# Patient Record
Sex: MALE | Race: WHITE
[De-identification: names, ages, dates, MRNs, and addresses within clinical notes are randomized per-mention and may not be internally consistent; named-entity substitution may affect disease eponyms.]

---

## 2018-06-07 ENCOUNTER — HOSPITAL ENCOUNTER (OUTPATIENT)
Dept: HOSPITAL 8 - ST | Age: 58
Discharge: HOME | End: 2018-06-07
Attending: FAMILY MEDICINE
Payer: COMMERCIAL

## 2018-06-07 DIAGNOSIS — M17.0: Primary | ICD-10-CM

## 2018-06-07 PROCEDURE — 93005 ELECTROCARDIOGRAM TRACING: CPT

## 2018-06-18 ENCOUNTER — HOSPITAL ENCOUNTER (OUTPATIENT)
Dept: HOSPITAL 8 - STAR | Age: 58
Discharge: HOME | End: 2018-06-18
Attending: ORTHOPAEDIC SURGERY
Payer: COMMERCIAL

## 2018-06-18 DIAGNOSIS — Z01.818: Primary | ICD-10-CM

## 2018-06-18 DIAGNOSIS — M17.0: ICD-10-CM

## 2018-06-18 LAB
BASOPHILS # BLD AUTO: 0.03 X10^3/UL (ref 0–0.1)
BASOPHILS NFR BLD AUTO: 0 % (ref 0–1)
CULTURE INDICATED?: YES
EOSINOPHIL # BLD AUTO: 0.32 X10^3/UL (ref 0–0.4)
EOSINOPHIL NFR BLD AUTO: 3 % (ref 1–7)
ERYTHROCYTE [DISTWIDTH] IN BLOOD BY AUTOMATED COUNT: 13.7 % (ref 9.4–14.8)
LYMPHOCYTES # BLD AUTO: 1.51 X10^3/UL (ref 1–3.4)
LYMPHOCYTES NFR BLD AUTO: 15 % (ref 22–44)
MCH RBC QN AUTO: 30.1 PG (ref 27.5–34.5)
MCHC RBC AUTO-ENTMCNC: 33.8 G/DL (ref 33.2–36.2)
MCV RBC AUTO: 89 FL (ref 81–97)
MD: NO
MICROSCOPIC: (no result)
MONOCYTES # BLD AUTO: 0.69 X10^3/UL (ref 0.2–0.8)
MONOCYTES NFR BLD AUTO: 7 % (ref 2–9)
NEUTROPHILS # BLD AUTO: 7.65 X10^3/UL (ref 1.8–6.8)
NEUTROPHILS NFR BLD AUTO: 75 % (ref 42–75)
PLATELET # BLD AUTO: 219 X10^3/UL (ref 130–400)
PMV BLD AUTO: 10.2 FL (ref 7.4–10.4)
RBC # BLD AUTO: 5.46 X10^6/UL (ref 4.38–5.82)

## 2018-06-18 PROCEDURE — 87086 URINE CULTURE/COLONY COUNT: CPT

## 2018-06-18 PROCEDURE — 85025 COMPLETE CBC W/AUTO DIFF WBC: CPT

## 2018-06-18 PROCEDURE — 87081 CULTURE SCREEN ONLY: CPT

## 2018-06-18 PROCEDURE — 81001 URINALYSIS AUTO W/SCOPE: CPT

## 2018-06-18 PROCEDURE — 36415 COLL VENOUS BLD VENIPUNCTURE: CPT

## 2018-06-25 ENCOUNTER — HOSPITAL ENCOUNTER (INPATIENT)
Dept: HOSPITAL 8 - ORIP | Age: 58
LOS: 4 days | Discharge: NURSING, REHAB OR LONG-TERM CARE | DRG: 462 | End: 2018-06-29
Attending: ORTHOPAEDIC SURGERY | Admitting: ORTHOPAEDIC SURGERY
Payer: COMMERCIAL

## 2018-06-25 VITALS — SYSTOLIC BLOOD PRESSURE: 140 MMHG | DIASTOLIC BLOOD PRESSURE: 92 MMHG

## 2018-06-25 VITALS — BODY MASS INDEX: 38.22 KG/M2 | WEIGHT: 288.36 LBS | HEIGHT: 73 IN

## 2018-06-25 DIAGNOSIS — E66.9: ICD-10-CM

## 2018-06-25 DIAGNOSIS — G47.33: ICD-10-CM

## 2018-06-25 DIAGNOSIS — F32.9: ICD-10-CM

## 2018-06-25 DIAGNOSIS — M17.0: Primary | ICD-10-CM

## 2018-06-25 PROCEDURE — 0SRC069 REPLACEMENT OF RIGHT KNEE JOINT WITH OXIDIZED ZIRCONIUM ON POLYETHYLENE SYNTHETIC SUBSTITUTE, CEMENTED, OPEN APPROACH: ICD-10-PCS | Performed by: ORTHOPAEDIC SURGERY

## 2018-06-25 PROCEDURE — 0SRD069 REPLACEMENT OF LEFT KNEE JOINT WITH OXIDIZED ZIRCONIUM ON POLYETHYLENE SYNTHETIC SUBSTITUTE, CEMENTED, OPEN APPROACH: ICD-10-PCS | Performed by: ORTHOPAEDIC SURGERY

## 2018-06-25 PROCEDURE — 3E0T3BZ INTRODUCTION OF ANESTHETIC AGENT INTO PERIPHERAL NERVES AND PLEXI, PERCUTANEOUS APPROACH: ICD-10-PCS | Performed by: ORTHOPAEDIC SURGERY

## 2018-06-25 PROCEDURE — 36415 COLL VENOUS BLD VENIPUNCTURE: CPT

## 2018-06-25 PROCEDURE — C1776 JOINT DEVICE (IMPLANTABLE): HCPCS

## 2018-06-25 PROCEDURE — C1713 ANCHOR/SCREW BN/BN,TIS/BN: HCPCS

## 2018-06-25 PROCEDURE — 85014 HEMATOCRIT: CPT

## 2018-06-25 PROCEDURE — 85018 HEMOGLOBIN: CPT

## 2018-06-25 RX ADMIN — DOCUSATE SODIUM SCH MG: 100 CAPSULE, LIQUID FILLED ORAL at 20:11

## 2018-06-25 RX ADMIN — OXYCODONE HYDROCHLORIDE SCH MG: 5 TABLET ORAL at 18:15

## 2018-06-25 RX ADMIN — ASPIRIN SCH MG: 81 TABLET, COATED ORAL at 22:49

## 2018-06-25 RX ADMIN — ONDANSETRON PRN MG: 2 INJECTION, SOLUTION INTRAMUSCULAR; INTRAVENOUS at 22:49

## 2018-06-25 RX ADMIN — HYDROMORPHONE HYDROCHLORIDE PRN MG: 1 INJECTION, SOLUTION INTRAMUSCULAR; INTRAVENOUS; SUBCUTANEOUS at 16:20

## 2018-06-25 RX ADMIN — ONDANSETRON PRN MG: 2 INJECTION, SOLUTION INTRAMUSCULAR; INTRAVENOUS at 16:19

## 2018-06-25 RX ADMIN — HYDROMORPHONE HYDROCHLORIDE PRN MG: 1 INJECTION, SOLUTION INTRAMUSCULAR; INTRAVENOUS; SUBCUTANEOUS at 16:15

## 2018-06-25 RX ADMIN — DEXTROSE AND SODIUM CHLORIDE SCH MLS/HR: 5; .45 INJECTION, SOLUTION INTRAVENOUS at 18:43

## 2018-06-25 RX ADMIN — ACETAMINOPHEN SCH MG: 160 SOLUTION ORAL at 20:11

## 2018-06-25 RX ADMIN — ACETAMINOPHEN SCH MG: 160 SOLUTION ORAL at 18:15

## 2018-06-25 RX ADMIN — CEFAZOLIN SODIUM SCH MLS/HR: 2 SOLUTION INTRAVENOUS at 20:19

## 2018-06-25 RX ADMIN — HYDROMORPHONE HYDROCHLORIDE PRN MG: 1 INJECTION, SOLUTION INTRAMUSCULAR; INTRAVENOUS; SUBCUTANEOUS at 16:30

## 2018-06-25 RX ADMIN — OXYCODONE HYDROCHLORIDE SCH MG: 5 TABLET ORAL at 20:11

## 2018-06-25 RX ADMIN — HYDROMORPHONE HYDROCHLORIDE PRN MG: 1 INJECTION, SOLUTION INTRAMUSCULAR; INTRAVENOUS; SUBCUTANEOUS at 16:35

## 2018-06-26 VITALS — DIASTOLIC BLOOD PRESSURE: 61 MMHG | SYSTOLIC BLOOD PRESSURE: 130 MMHG

## 2018-06-26 VITALS — SYSTOLIC BLOOD PRESSURE: 121 MMHG | DIASTOLIC BLOOD PRESSURE: 75 MMHG

## 2018-06-26 VITALS — DIASTOLIC BLOOD PRESSURE: 70 MMHG | SYSTOLIC BLOOD PRESSURE: 130 MMHG

## 2018-06-26 VITALS — DIASTOLIC BLOOD PRESSURE: 67 MMHG | SYSTOLIC BLOOD PRESSURE: 111 MMHG

## 2018-06-26 VITALS — DIASTOLIC BLOOD PRESSURE: 70 MMHG | SYSTOLIC BLOOD PRESSURE: 129 MMHG

## 2018-06-26 RX ADMIN — SERTRALINE HYDROCHLORIDE SCH MG: 50 TABLET ORAL at 08:50

## 2018-06-26 RX ADMIN — ACETAMINOPHEN SCH MG: 160 SOLUTION ORAL at 03:30

## 2018-06-26 RX ADMIN — OXYCODONE HYDROCHLORIDE SCH MG: 5 TABLET ORAL at 21:46

## 2018-06-26 RX ADMIN — CEFAZOLIN SODIUM SCH MLS/HR: 2 SOLUTION INTRAVENOUS at 05:12

## 2018-06-26 RX ADMIN — Medication SCH TAB: at 08:50

## 2018-06-26 RX ADMIN — ACETAMINOPHEN SCH MG: 160 SOLUTION ORAL at 21:46

## 2018-06-26 RX ADMIN — DEXTROSE AND SODIUM CHLORIDE SCH MLS/HR: 5; .45 INJECTION, SOLUTION INTRAVENOUS at 14:30

## 2018-06-26 RX ADMIN — DOCUSATE SODIUM SCH MG: 100 CAPSULE, LIQUID FILLED ORAL at 08:51

## 2018-06-26 RX ADMIN — OXYCODONE HYDROCHLORIDE SCH MG: 5 TABLET ORAL at 13:20

## 2018-06-26 RX ADMIN — OXYCODONE HYDROCHLORIDE SCH MG: 5 TABLET ORAL at 09:33

## 2018-06-26 RX ADMIN — OXYCODONE HYDROCHLORIDE SCH MG: 5 TABLET ORAL at 17:46

## 2018-06-26 RX ADMIN — DEXTROSE AND SODIUM CHLORIDE SCH MLS/HR: 5; .45 INJECTION, SOLUTION INTRAVENOUS at 21:10

## 2018-06-26 RX ADMIN — ACETAMINOPHEN SCH MG: 160 SOLUTION ORAL at 08:51

## 2018-06-26 RX ADMIN — ASPIRIN SCH MG: 81 TABLET, COATED ORAL at 17:46

## 2018-06-26 RX ADMIN — OXYCODONE HYDROCHLORIDE SCH MG: 5 TABLET ORAL at 01:08

## 2018-06-26 RX ADMIN — ASPIRIN SCH MG: 81 TABLET, COATED ORAL at 05:43

## 2018-06-26 RX ADMIN — DEXTROSE AND SODIUM CHLORIDE SCH MLS/HR: 5; .45 INJECTION, SOLUTION INTRAVENOUS at 02:07

## 2018-06-26 RX ADMIN — DOCUSATE SODIUM SCH MG: 100 CAPSULE, LIQUID FILLED ORAL at 21:45

## 2018-06-26 RX ADMIN — DEXTROSE AND SODIUM CHLORIDE SCH MLS/HR: 5; .45 INJECTION, SOLUTION INTRAVENOUS at 07:50

## 2018-06-26 RX ADMIN — ACETAMINOPHEN SCH MG: 160 SOLUTION ORAL at 14:59

## 2018-06-26 RX ADMIN — OXYCODONE HYDROCHLORIDE SCH MG: 5 TABLET ORAL at 05:43

## 2018-06-27 VITALS — SYSTOLIC BLOOD PRESSURE: 120 MMHG | DIASTOLIC BLOOD PRESSURE: 75 MMHG

## 2018-06-27 VITALS — SYSTOLIC BLOOD PRESSURE: 121 MMHG | DIASTOLIC BLOOD PRESSURE: 68 MMHG

## 2018-06-27 VITALS — DIASTOLIC BLOOD PRESSURE: 68 MMHG | SYSTOLIC BLOOD PRESSURE: 116 MMHG

## 2018-06-27 VITALS — SYSTOLIC BLOOD PRESSURE: 126 MMHG | DIASTOLIC BLOOD PRESSURE: 75 MMHG

## 2018-06-27 RX ADMIN — DIAZEPAM PRN MG: 5 TABLET ORAL at 11:27

## 2018-06-27 RX ADMIN — OXYCODONE HYDROCHLORIDE SCH MG: 5 TABLET ORAL at 09:29

## 2018-06-27 RX ADMIN — ASPIRIN SCH MG: 81 TABLET, COATED ORAL at 05:38

## 2018-06-27 RX ADMIN — OXYCODONE HYDROCHLORIDE SCH MG: 5 TABLET ORAL at 01:39

## 2018-06-27 RX ADMIN — DEXTROSE AND SODIUM CHLORIDE SCH MLS/HR: 5; .45 INJECTION, SOLUTION INTRAVENOUS at 22:28

## 2018-06-27 RX ADMIN — DIAZEPAM PRN MG: 5 TABLET ORAL at 23:57

## 2018-06-27 RX ADMIN — SERTRALINE HYDROCHLORIDE SCH MG: 50 TABLET ORAL at 09:36

## 2018-06-27 RX ADMIN — ACETAMINOPHEN SCH MG: 160 SOLUTION ORAL at 15:28

## 2018-06-27 RX ADMIN — ACETAMINOPHEN SCH MG: 160 SOLUTION ORAL at 21:26

## 2018-06-27 RX ADMIN — DIAZEPAM PRN MG: 5 TABLET ORAL at 07:36

## 2018-06-27 RX ADMIN — DEXTROSE AND SODIUM CHLORIDE SCH MLS/HR: 5; .45 INJECTION, SOLUTION INTRAVENOUS at 17:10

## 2018-06-27 RX ADMIN — DOCUSATE SODIUM SCH MG: 100 CAPSULE, LIQUID FILLED ORAL at 20:05

## 2018-06-27 RX ADMIN — ASPIRIN SCH MG: 81 TABLET, COATED ORAL at 17:47

## 2018-06-27 RX ADMIN — Medication SCH TAB: at 09:29

## 2018-06-27 RX ADMIN — OXYCODONE HYDROCHLORIDE SCH MG: 5 TABLET ORAL at 13:25

## 2018-06-27 RX ADMIN — ACETAMINOPHEN SCH MG: 160 SOLUTION ORAL at 03:46

## 2018-06-27 RX ADMIN — DIAZEPAM PRN MG: 5 TABLET ORAL at 20:05

## 2018-06-27 RX ADMIN — OXYCODONE HYDROCHLORIDE SCH MG: 5 TABLET ORAL at 21:26

## 2018-06-27 RX ADMIN — DEXTROSE AND SODIUM CHLORIDE SCH MLS/HR: 5; .45 INJECTION, SOLUTION INTRAVENOUS at 03:50

## 2018-06-27 RX ADMIN — DOCUSATE SODIUM SCH MG: 100 CAPSULE, LIQUID FILLED ORAL at 09:29

## 2018-06-27 RX ADMIN — DIAZEPAM PRN MG: 5 TABLET ORAL at 02:47

## 2018-06-27 RX ADMIN — OXYCODONE HYDROCHLORIDE SCH MG: 5 TABLET ORAL at 17:30

## 2018-06-27 RX ADMIN — DEXTROSE AND SODIUM CHLORIDE SCH MLS/HR: 5; .45 INJECTION, SOLUTION INTRAVENOUS at 10:30

## 2018-06-27 RX ADMIN — DIAZEPAM PRN MG: 5 TABLET ORAL at 15:28

## 2018-06-27 RX ADMIN — OXYCODONE HYDROCHLORIDE SCH MG: 5 TABLET ORAL at 05:37

## 2018-06-27 RX ADMIN — ACETAMINOPHEN SCH MG: 160 SOLUTION ORAL at 09:28

## 2018-06-28 VITALS — DIASTOLIC BLOOD PRESSURE: 75 MMHG | SYSTOLIC BLOOD PRESSURE: 122 MMHG

## 2018-06-28 VITALS — DIASTOLIC BLOOD PRESSURE: 67 MMHG | SYSTOLIC BLOOD PRESSURE: 105 MMHG

## 2018-06-28 VITALS — SYSTOLIC BLOOD PRESSURE: 126 MMHG | DIASTOLIC BLOOD PRESSURE: 75 MMHG

## 2018-06-28 VITALS — DIASTOLIC BLOOD PRESSURE: 68 MMHG | SYSTOLIC BLOOD PRESSURE: 123 MMHG

## 2018-06-28 RX ADMIN — DIAZEPAM PRN MG: 5 TABLET ORAL at 04:38

## 2018-06-28 RX ADMIN — ASPIRIN SCH MG: 81 TABLET, COATED ORAL at 18:06

## 2018-06-28 RX ADMIN — ACETAMINOPHEN SCH MG: 160 SOLUTION ORAL at 03:45

## 2018-06-28 RX ADMIN — DEXTROSE AND SODIUM CHLORIDE SCH MLS/HR: 5; .45 INJECTION, SOLUTION INTRAVENOUS at 05:41

## 2018-06-28 RX ADMIN — OXYCODONE HYDROCHLORIDE SCH MG: 5 TABLET ORAL at 10:03

## 2018-06-28 RX ADMIN — Medication SCH TAB: at 08:20

## 2018-06-28 RX ADMIN — ACETAMINOPHEN SCH MG: 160 SOLUTION ORAL at 22:33

## 2018-06-28 RX ADMIN — ACETAMINOPHEN SCH MG: 160 SOLUTION ORAL at 10:02

## 2018-06-28 RX ADMIN — OXYCODONE HYDROCHLORIDE SCH MG: 5 TABLET ORAL at 18:06

## 2018-06-28 RX ADMIN — DIAZEPAM PRN MG: 5 TABLET ORAL at 19:55

## 2018-06-28 RX ADMIN — ACETAMINOPHEN SCH MG: 160 SOLUTION ORAL at 15:25

## 2018-06-28 RX ADMIN — OXYCODONE HYDROCHLORIDE SCH MG: 5 TABLET ORAL at 22:34

## 2018-06-28 RX ADMIN — OXYCODONE HYDROCHLORIDE SCH MG: 5 TABLET ORAL at 01:31

## 2018-06-28 RX ADMIN — DEXTROSE AND SODIUM CHLORIDE SCH MLS/HR: 5; .45 INJECTION, SOLUTION INTRAVENOUS at 13:10

## 2018-06-28 RX ADMIN — DOCUSATE SODIUM SCH MG: 100 CAPSULE, LIQUID FILLED ORAL at 22:41

## 2018-06-28 RX ADMIN — SERTRALINE HYDROCHLORIDE SCH MG: 50 TABLET ORAL at 08:20

## 2018-06-28 RX ADMIN — DOCUSATE SODIUM SCH MG: 100 CAPSULE, LIQUID FILLED ORAL at 08:20

## 2018-06-28 RX ADMIN — OXYCODONE HYDROCHLORIDE SCH MG: 5 TABLET ORAL at 05:41

## 2018-06-28 RX ADMIN — ASPIRIN SCH MG: 81 TABLET, COATED ORAL at 05:41

## 2018-06-28 RX ADMIN — OXYCODONE HYDROCHLORIDE SCH MG: 5 TABLET ORAL at 14:24

## 2018-06-28 RX ADMIN — DEXTROSE AND SODIUM CHLORIDE SCH MLS/HR: 5; .45 INJECTION, SOLUTION INTRAVENOUS at 19:50

## 2018-06-29 VITALS — DIASTOLIC BLOOD PRESSURE: 63 MMHG | SYSTOLIC BLOOD PRESSURE: 106 MMHG

## 2018-06-29 VITALS — SYSTOLIC BLOOD PRESSURE: 120 MMHG | DIASTOLIC BLOOD PRESSURE: 69 MMHG

## 2018-06-29 VITALS — DIASTOLIC BLOOD PRESSURE: 54 MMHG | SYSTOLIC BLOOD PRESSURE: 108 MMHG

## 2018-06-29 RX ADMIN — ACETAMINOPHEN SCH MG: 160 SOLUTION ORAL at 05:21

## 2018-06-29 RX ADMIN — DIAZEPAM PRN MG: 5 TABLET ORAL at 03:12

## 2018-06-29 RX ADMIN — ASPIRIN SCH MG: 81 TABLET, COATED ORAL at 07:49

## 2018-06-29 RX ADMIN — OXYCODONE HYDROCHLORIDE SCH MG: 5 TABLET ORAL at 07:50

## 2018-06-29 RX ADMIN — Medication SCH TAB: at 09:26

## 2018-06-29 RX ADMIN — OXYCODONE HYDROCHLORIDE SCH MG: 5 TABLET ORAL at 15:43

## 2018-06-29 RX ADMIN — DOCUSATE SODIUM SCH MG: 100 CAPSULE, LIQUID FILLED ORAL at 09:28

## 2018-06-29 RX ADMIN — OXYCODONE HYDROCHLORIDE SCH MG: 5 TABLET ORAL at 03:05

## 2018-06-29 RX ADMIN — OXYCODONE HYDROCHLORIDE SCH MG: 5 TABLET ORAL at 11:38

## 2018-06-29 RX ADMIN — ACETAMINOPHEN SCH MG: 160 SOLUTION ORAL at 11:39

## 2018-06-29 RX ADMIN — SERTRALINE HYDROCHLORIDE SCH MG: 50 TABLET ORAL at 09:30

## 2018-06-29 RX ADMIN — SERTRALINE HYDROCHLORIDE SCH MG: 50 TABLET ORAL at 09:27

## 2021-06-17 ENCOUNTER — HOSPITAL ENCOUNTER (EMERGENCY)
Dept: HOSPITAL 8 - ED | Age: 61
Discharge: HOME | End: 2021-06-17
Payer: COMMERCIAL

## 2021-06-17 VITALS — HEIGHT: 74 IN | BODY MASS INDEX: 34.52 KG/M2 | WEIGHT: 268.96 LBS

## 2021-06-17 VITALS — DIASTOLIC BLOOD PRESSURE: 74 MMHG | SYSTOLIC BLOOD PRESSURE: 125 MMHG

## 2021-06-17 DIAGNOSIS — N13.2: Primary | ICD-10-CM

## 2021-06-17 LAB
ALBUMIN SERPL-MCNC: 3.5 G/DL (ref 3.4–5)
ALP SERPL-CCNC: 105 U/L (ref 45–117)
ALT SERPL-CCNC: 56 U/L (ref 12–78)
ANION GAP SERPL CALC-SCNC: 8 MMOL/L (ref 5–15)
BASOPHILS # BLD AUTO: 0.1 X10^3/UL (ref 0–0.1)
BASOPHILS NFR BLD AUTO: 1 % (ref 0–1)
BILIRUB SERPL-MCNC: 0.2 MG/DL (ref 0.2–1)
CALCIUM SERPL-MCNC: 8.9 MG/DL (ref 8.5–10.1)
CHLORIDE SERPL-SCNC: 112 MMOL/L (ref 98–107)
CREAT SERPL-MCNC: 0.89 MG/DL (ref 0.7–1.3)
EOSINOPHIL # BLD AUTO: 0.8 X10^3/UL (ref 0–0.4)
EOSINOPHIL NFR BLD AUTO: 7 % (ref 1–7)
ERYTHROCYTE [DISTWIDTH] IN BLOOD BY AUTOMATED COUNT: 16.2 % (ref 9.4–14.8)
LYMPHOCYTES # BLD AUTO: 2.6 X10^3/UL (ref 1–3.4)
LYMPHOCYTES NFR BLD AUTO: 24 % (ref 22–44)
MCH RBC QN AUTO: 28.1 PG (ref 27.5–34.5)
MCHC RBC AUTO-ENTMCNC: 33.7 G/DL (ref 33.2–36.2)
MICROSCOPIC: (no result)
MONOCYTES # BLD AUTO: 1 X10^3/UL (ref 0.2–0.8)
MONOCYTES NFR BLD AUTO: 10 % (ref 2–9)
NEUTROPHILS # BLD AUTO: 6.5 X10^3/UL (ref 1.8–6.8)
NEUTROPHILS NFR BLD AUTO: 59 % (ref 42–75)
PLATELET # BLD AUTO: 243 X10^3/UL (ref 130–400)
PMV BLD AUTO: 9 FL (ref 7.4–10.4)
PROT SERPL-MCNC: 7.4 G/DL (ref 6.4–8.2)
RBC # BLD AUTO: 4.88 X10^6/UL (ref 4.38–5.82)

## 2021-06-17 PROCEDURE — 36415 COLL VENOUS BLD VENIPUNCTURE: CPT

## 2021-06-17 PROCEDURE — 74177 CT ABD & PELVIS W/CONTRAST: CPT

## 2021-06-17 PROCEDURE — 87086 URINE CULTURE/COLONY COUNT: CPT

## 2021-06-17 PROCEDURE — 96374 THER/PROPH/DIAG INJ IV PUSH: CPT

## 2021-06-17 PROCEDURE — 96375 TX/PRO/DX INJ NEW DRUG ADDON: CPT

## 2021-06-17 PROCEDURE — 99285 EMERGENCY DEPT VISIT HI MDM: CPT

## 2021-06-17 PROCEDURE — 81001 URINALYSIS AUTO W/SCOPE: CPT

## 2021-06-17 PROCEDURE — 85025 COMPLETE CBC W/AUTO DIFF WBC: CPT

## 2021-06-17 PROCEDURE — 83690 ASSAY OF LIPASE: CPT

## 2021-06-17 PROCEDURE — 80053 COMPREHEN METABOLIC PANEL: CPT

## 2021-06-17 NOTE — NUR
RELIEF RN: PT A&OX4. VSS. DENIES ANY PAIN. AWARE OF PLAN TO ADMIT, REQUESTING 
JELLO/ICE CREAM. [facility]

## 2021-06-17 NOTE — NUR
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 PAIN ON RIGHT 
FRONT ABD AND RIGHT FLANK.

UA OBTAINED QUITE BLOODY. "I HAVE A HX OF KIDNEY STONES OR IT COULD BE MY 
CANCER."

 TOOK 1GM OF APAP AT 10P

## 2021-06-26 ENCOUNTER — HOSPITAL ENCOUNTER (OUTPATIENT)
Dept: HOSPITAL 8 - OR | Age: 61
Discharge: HOME | End: 2021-06-26
Attending: UROLOGY
Payer: COMMERCIAL

## 2021-06-26 VITALS — DIASTOLIC BLOOD PRESSURE: 78 MMHG | SYSTOLIC BLOOD PRESSURE: 126 MMHG

## 2021-06-26 VITALS — WEIGHT: 271.17 LBS | HEIGHT: 75 IN | BODY MASS INDEX: 33.72 KG/M2

## 2021-06-26 VITALS — SYSTOLIC BLOOD PRESSURE: 126 MMHG | DIASTOLIC BLOOD PRESSURE: 89 MMHG

## 2021-06-26 DIAGNOSIS — Z98.890: ICD-10-CM

## 2021-06-26 DIAGNOSIS — Z88.8: ICD-10-CM

## 2021-06-26 DIAGNOSIS — Z20.822: ICD-10-CM

## 2021-06-26 DIAGNOSIS — Z79.82: ICD-10-CM

## 2021-06-26 DIAGNOSIS — Z79.899: ICD-10-CM

## 2021-06-26 DIAGNOSIS — N20.1: Primary | ICD-10-CM

## 2021-06-26 DIAGNOSIS — F15.90: ICD-10-CM

## 2021-06-26 PROCEDURE — 52356 CYSTO/URETERO W/LITHOTRIPSY: CPT

## 2021-06-26 PROCEDURE — 76000 FLUOROSCOPY <1 HR PHYS/QHP: CPT

## 2021-06-26 PROCEDURE — C1769 GUIDE WIRE: HCPCS

## 2021-06-26 PROCEDURE — 87635 SARS-COV-2 COVID-19 AMP PRB: CPT

## 2021-06-26 PROCEDURE — 74018 RADEX ABDOMEN 1 VIEW: CPT

## 2021-06-26 PROCEDURE — C2617 STENT, NON-COR, TEM W/O DEL: HCPCS

## 2021-06-26 RX ADMIN — OXYCODONE HYDROCHLORIDE PRN MG: 5 SOLUTION ORAL at 12:00

## 2021-06-26 RX ADMIN — OXYCODONE HYDROCHLORIDE PRN MG: 5 SOLUTION ORAL at 11:30
